# Patient Record
Sex: FEMALE | URBAN - METROPOLITAN AREA
[De-identification: names, ages, dates, MRNs, and addresses within clinical notes are randomized per-mention and may not be internally consistent; named-entity substitution may affect disease eponyms.]

---

## 2020-06-01 ENCOUNTER — OFFICE VISIT (OUTPATIENT)
Dept: URBAN - METROPOLITAN AREA TELEHEALTH 2 | Facility: TELEHEALTH | Age: 30
End: 2020-06-01
Payer: SELF-PAY

## 2020-06-01 DIAGNOSIS — R10.32 LLQ ABDOMINAL PAIN: ICD-10-CM

## 2020-06-01 DIAGNOSIS — K21.9 GERD: ICD-10-CM

## 2020-06-01 DIAGNOSIS — R63.4 WEIGHT LOSS: ICD-10-CM

## 2020-06-01 PROCEDURE — 99213 OFFICE O/P EST LOW 20 MIN: CPT | Performed by: INTERNAL MEDICINE

## 2020-06-01 PROCEDURE — G8427 DOCREV CUR MEDS BY ELIG CLIN: HCPCS | Performed by: INTERNAL MEDICINE

## 2020-06-01 PROCEDURE — G9903 PT SCRN TBCO ID AS NON USER: HCPCS | Performed by: INTERNAL MEDICINE

## 2020-06-01 RX ORDER — OMEPRAZOLE 40 MG/1
1 CAPSULE 30 MINUTES BEFORE MORNING MEAL CAPSULE, DELAYED RELEASE ORAL ONCE A DAY
Qty: 30 | OUTPATIENT
Start: 2020-06-02

## 2020-06-01 NOTE — PHYSICAL EXAM EYES:
Conjuntivae and eyelids appear normal, Sclerae : White without injection , Conjuntivae and eyelids appear normal , Sclerae : White without injection

## 2020-06-01 NOTE — PHYSICAL EXAM GASTROINTESTINAL
Abdomen, soft, nontender, nondistended, no guarding or rigidity, no masses palpable, normal bowel sounds, Liver and Spleen, no hepatomegaly present, no hepatosplenomegaly, liver nontender, spleen not palpable , Self Exam: Abdomen soft, non-tender to palpatation, non-distended

## 2020-06-01 NOTE — PHYSICAL EXAM CONSTITUTIONAL:
in no acute distress,  well developed, obese,  ambulating without difficulty,  normal communication ability , pleasant, well nourished, well developed, in no acute distress , normal communication ability

## 2020-06-01 NOTE — PHYSICAL EXAM SKIN:
no rashes, no suspicious lesions, no areas of discoloration, no jaundice present, good turgor, no masses, no tenderness on palpation , no rashes , no suspicious lesions , no areas of discoloration

## 2020-06-01 NOTE — PHYSICAL EXAM NEUROLOGIC:
oriented to person, place and time, normal sensation, short and long term memory intact, sensory exam intact , oriented to person, place and time

## 2020-06-01 NOTE — PHYSICAL EXAM HENT:
Head, normocephalic, atraumatic, Face, Face within normal limits, Ears, External ears within normal limits, Nose/Nasopharynx, External nose  normal appearance, nares patent, no nasal discharge, Mouth and Throat, Oral cavity appearance normal, Breath odor normal, Lips, Appearance normal , Head , normocephalic , atraumatic , Face , Face within normal limits , Ears , External ears within normal limits , Nose/Nasopharynx , External nose  normal appearance , Mouth and Throat , Oral cavity appearance normal

## 2020-06-01 NOTE — PHYSICAL EXAM CHEST:
no lesions, no deformities, no traumatic injuries, no significant scars are present, chest wall non-tender, no masses present, tactile fremitus is normal, breathing is unlabored without accessory muscle use., normal breath sounds. , breathing is unlabored without accessory muscle use.

## 2020-06-01 NOTE — HPI-OTHER HISTORIES
This is a pleasant 28 yo female who reports chronic nausea for one year.  Her symptoms are mostly in the morning.  She admits to heartburn, left sided abodmnal pain for one week rated an 8 and lasts a few hours.  Menses is regular.  She denies a history of uterine fibroids and ovarian cysts.   Stool evacuation occurs twice a day.  She reports the sensation of complete emptying.  There is no associated vomiting.   She reports an intentional 33 pound weight loss over several months due to healthy lifestyle changes.

## 2020-06-02 ENCOUNTER — DASHBOARD ENCOUNTERS (OUTPATIENT)
Age: 30
End: 2020-06-02